# Patient Record
Sex: MALE | Race: WHITE | NOT HISPANIC OR LATINO | Employment: OTHER | URBAN - METROPOLITAN AREA
[De-identification: names, ages, dates, MRNs, and addresses within clinical notes are randomized per-mention and may not be internally consistent; named-entity substitution may affect disease eponyms.]

---

## 2024-07-09 RX ORDER — ATORVASTATIN CALCIUM 20 MG/1
TABLET, FILM COATED ORAL
Refills: 0 | OUTPATIENT
Start: 2024-07-09

## 2024-07-09 NOTE — TELEPHONE ENCOUNTER
Patient has never been seen at [place] before. No history of med list. Patient has upcoming appointment.    Appointments in Next Year      Sep 11, 2024 9:30 AM  (Arrive by 9:10 AM)  New Annual Wellness Visit with Sommer Traore MD  [facility] [place] ([facility] ) [phone]

## 2024-09-11 ENCOUNTER — OFFICE VISIT (OUTPATIENT)
Dept: FAMILY MEDICINE | Facility: CLINIC | Age: 79
End: 2024-09-11
Payer: COMMERCIAL

## 2024-09-11 ENCOUNTER — TELEPHONE (OUTPATIENT)
Dept: FAMILY MEDICINE | Facility: CLINIC | Age: 79
End: 2024-09-11

## 2024-09-11 VITALS
TEMPERATURE: 97.7 F | RESPIRATION RATE: 21 BRPM | HEIGHT: 70 IN | SYSTOLIC BLOOD PRESSURE: 118 MMHG | HEART RATE: 87 BPM | OXYGEN SATURATION: 98 % | DIASTOLIC BLOOD PRESSURE: 72 MMHG | WEIGHT: 187 LBS | BODY MASS INDEX: 26.77 KG/M2

## 2024-09-11 DIAGNOSIS — Z86.73 HISTORY OF CVA (CEREBROVASCULAR ACCIDENT): ICD-10-CM

## 2024-09-11 DIAGNOSIS — E78.5 HYPERLIPIDEMIA LDL GOAL <130: ICD-10-CM

## 2024-09-11 DIAGNOSIS — N52.8 OTHER MALE ERECTILE DYSFUNCTION: ICD-10-CM

## 2024-09-11 DIAGNOSIS — Z00.00 ROUTINE HISTORY AND PHYSICAL EXAMINATION OF ADULT: Primary | ICD-10-CM

## 2024-09-11 DIAGNOSIS — Z86.718 PERSONAL HISTORY OF DVT (DEEP VEIN THROMBOSIS): ICD-10-CM

## 2024-09-11 DIAGNOSIS — J30.2 SEASONAL ALLERGIC RHINITIS, UNSPECIFIED TRIGGER: ICD-10-CM

## 2024-09-11 PROCEDURE — 99204 OFFICE O/P NEW MOD 45 MIN: CPT | Mod: 25 | Performed by: FAMILY MEDICINE

## 2024-09-11 PROCEDURE — G0438 PPPS, INITIAL VISIT: HCPCS | Performed by: FAMILY MEDICINE

## 2024-09-11 RX ORDER — SILDENAFIL CITRATE 20 MG/1
20 TABLET ORAL DAILY PRN
Qty: 30 TABLET | Refills: 2 | Status: SHIPPED | OUTPATIENT
Start: 2024-09-11

## 2024-09-11 RX ORDER — OMEGA-3-ACID ETHYL ESTERS 900 MG/1
CAPSULE, LIQUID FILLED ORAL
COMMUNITY

## 2024-09-11 RX ORDER — IBUPROFEN 200 MG
200 TABLET ORAL EVERY 4 HOURS PRN
COMMUNITY

## 2024-09-11 RX ORDER — CETIRIZINE HYDROCHLORIDE 10 MG/1
10 TABLET ORAL DAILY
COMMUNITY

## 2024-09-11 RX ORDER — FLUTICASONE PROPIONATE 50 MCG
1 SPRAY, SUSPENSION (ML) NASAL
COMMUNITY
End: 2024-09-11

## 2024-09-11 RX ORDER — FLUTICASONE PROPIONATE 50 MCG
1 SPRAY, SUSPENSION (ML) NASAL DAILY PRN
Qty: 1 G | Refills: 11 | Status: SHIPPED | OUTPATIENT
Start: 2024-09-11

## 2024-09-11 RX ORDER — ATORVASTATIN CALCIUM 20 MG/1
20 TABLET, FILM COATED ORAL DAILY
Qty: 90 TABLET | Refills: 3 | Status: SHIPPED | OUTPATIENT
Start: 2024-09-11

## 2024-09-11 RX ORDER — SILDENAFIL CITRATE 20 MG/1
20 TABLET ORAL DAILY
COMMUNITY
Start: 2023-06-06 | End: 2024-09-11

## 2024-09-11 RX ORDER — ATORVASTATIN CALCIUM 20 MG/1
20 TABLET, FILM COATED ORAL DAILY
COMMUNITY
Start: 2023-07-12 | End: 2024-09-11

## 2024-09-11 RX ORDER — ASPIRIN 81 MG/1
81 TABLET ORAL DAILY
COMMUNITY

## 2024-09-11 RX ORDER — MULTIVITAMIN
1 TABLET ORAL
COMMUNITY

## 2024-09-11 ASSESSMENT — ACTIVITIES OF DAILY LIVING (ADL): CURRENT_FUNCTION: NO ASSISTANCE NEEDED

## 2024-09-11 ASSESSMENT — PAIN SCALES - GENERAL: PAINLEVEL: NO PAIN (0)

## 2024-09-11 NOTE — PROGRESS NOTES
"Preventive Care Visit  [facility]AEGANMAYRA DICK  Sommer Traore MD, Family Medicine  Sep 11, 2024      Assessment & Plan     (Z00.00) Routine history and physical examination of adult  (primary encounter diagnosis)  Comment:   Plan: has moved to [place] recently and here to establish care             (E78.5) Hyperlipidemia LDL goal <130  Comment:   Plan: Lipid panel reflex to direct LDL Non-fasting,         atorvastatin (LIPITOR) 20 MG tablet,         fluticasone (FLONASE) 50 MCG/ACT nasal spray,         Comprehensive metabolic panel        Check labs , adjusts dose if needed     (N52.8) Other male erectile dysfunction  Comment: has been stable previously and had no problem taking it.  Plan: sildenafil (REVATIO) 20 MG table            (J30.2) Seasonal allergic rhinitis, unspecified trigger  Comment: winter and spring can be worse - get ETD , well controlled with Flonase and wants to continue   Plan: ftuticasone (FLONASE) 50 MCG/ACT nasal spray            (Z86.73) History of CVA (cerebrovascular accident)  Comment: 10/2023 - has seen neurology/ neurophthalmology- had episode of  diplopia  Plan: fluticasone (FLONASE) 50 MCG/ACT nasal spray            (Z86.718) Personal history of DVT (deep vein thrombosis)  Comment: AFTER KNEE SURGERY / PROVOKED DVT / PE - 2016, ON ASA NOW  Plan: fluticasone (FLONASE) 50 MCG/ACT nasal spray              Check labs. refill sent.Cares and  treatment discussed.  follow. up if problem   Patient expressed understanding and agreement with treatment plan. All patient's questions were answered, will let me know if has more later.  Medications: Rx's: Reviewed the potential side effects/complications of medications prescribed.         BMI  Estimated body mass index is 26.98 kg/m  as calculated from the following:    Height as of this encounter: 1.773 m (5' 9.8\").    Weight as of this encounter: 84.8 kg (187 lb).       Counseling  Appropriate preventive services were addressed with " "this patient via screening, questionnaire, or discussion as appropriate for fall prevention, nutrition, physical activity, Tobacco-use cessation, social engagement, weight loss and cognition.  Checklist reviewing preventive services available has been given to the patient.  Reviewed patient's diet, addressing concerns and/or questions.   The patient was provided with written information regarding signs of hearing loss.       Regular exercise  See Patient Instructions    Subjective   Jairon is a 78 year old, presenting for the following:  Annual Visit        9/11/2024     9:12 AM   Additional Questions   Roomed by Elizabeth KRISHNAN         Health Care Directive  Patient does not have a Health Care Directive or Living Will: Discussed advance care planning with patient; however, patient declined at this time.    Healthy Habits:     In general, how would you rate your overall health?  Good    Frequency of exercise:  4-5 days/week    Duration of exercise:  30-45 minutes    Do you usually eat at least 4 servings of fruit and vegetables a day, include whole grains    & fiber and avoid regularly eating high fat or \"junk\" foods?  Yes    Taking medications regularly:  Yes    Barriers to taking medications:  None    Ability to successfully perform activities of daily living:  No assistance needed    Home Safety:  No safety concerns identified    Hearing Impairment:  Difficulty following a conversation in a noisy restaurant or crowded room    In the past 6 months, have you been bothered by leaking of urine?  No    In general, how would you rate your overall mental or emotional health?  Excellent    Additional concerns today:  No        Hyperlipidemia Follow-Up    Are you regularly taking any medication or supplement to lower your cholesterol?   Yes- see epic   Are you having muscle aches or other side effects that you think could be caused by your cholesterol lowering medication?  No    Has hx of minor CVA event that presented as episode of " diplopia . Had seen neurology/ neuro-opthalmology and he was diagnosed with minor CVA. He has been on statin and asa now since.. has been doing well and no recurrence of sx since then.   He is not fasting for labs today though   He also has hx of provoked DVT/ complicated by PE, after  his knee surgery 10 yrs ago. So was on blood thinner for a short time then      9/6/2024   General Health   How would you rate your overall physical health? Good   Feel stress (tense, anxious, or unable to sleep) Not at all            9/6/2024   Nutrition   Diet: Regular (no restrictions)            9/6/2024   Exercise   Days per week of moderate/strenous exercise 5 days   Average minutes spent exercising at this level 40 min            9/6/2024   Social Factors   Frequency of gathering with friends or relatives More than three times a week   Worry food won't last until get money to buy more No   Food not last or not have enough money for food? No   Do you have housing? (Housing is defined as stable permanent housing and does not include staying ouside in a car, in a tent, in an abandoned building, in an overnight shelter, or couch-surfing.) Yes   Are you worried about losing your housing? No   Lack of transportation? No   Unable to get utilities (heat,electricity)? No            9/6/2024   Fall Risk   Fallen 2 or more times in the past year? No   Trouble with walking or balance? No             9/6/2024   Activities of Daily Living- Home Safety   Needs help with the following daily activites None of the above   Safety concerns in the home None of the above            9/6/2024   Dental   Dentist two times every year? Yes            9/6/2024   Hearing Screening   Hearing concerns? (!) IT'S HARD TO FOLLOW A CONVERSATION IN A NOISY RESTAURANT OR CROWDED ROOM.            9/6/2024   Driving Risk Screening   Patient/family members have concerns about driving No            9/6/2024   General Alertness/Fatigue Screening   Have you been more  tired than usual lately? No            2024   Urinary Incontinence Screening   Bothered by leaking urine in past 6 months No            2024   TB Screening   Were you born outside of the [place]? No            Today's PHQ-2 Score:       9/10/2024     3:47 PM   PHQ-2 (  [employer])   Q1: Little interest or pleasure in doing things 0   Q2: Feeling down, depressed or hopeless 0   PHQ-2 Score 0   Q1: Little interest or pleasure in doing things Not at all   Q2: Feeling down, depressed or hopeless Not at all   PHQ-2 Score 0           2024   Substance Use   Alcohol more than 3/day or more than 7/wk No   Do you have a current opioid prescription? No   How severe/bad is pain from 1 to 10? 0/10 (No Pain)   Do you use any other substances recreationally? No        Social History     Tobacco Use    Smoking status: Never    Smokeless tobacco: Never   Substance Use Topics    Alcohol use: Yes     Comment: Often glass wine with supper    Drug use: Never         Fracture Risk Assessment Tool  Link to Frax Calculator  Use the information below to complete the Frax calculator  : 1945  Sex: male  Weight (kg): 84.8 kg (actual weight)  Height (cm): 177.3 cm  Previous Fragility Fracture:  No  History of parent with fractured hip:  No  Current Smoking:  No  Patient has been on glucocorticoids for more than 3 months (5mg/day or more): No  Rheumatoid Arthritis on Problem List:  No  Secondary Osteoporosis on Problem List:  No  Consumes 3 or more units of alcohol per day: No  Femoral Neck BMD (g/cm2)            Reviewed and updated as needed this visit by Provider                    Patient Active Problem List   Diagnosis    History of CVA (cerebrovascular accident)    Personal history of DVT (deep vein thrombosis)    Hyperlipidemia LDL goal <130    Other male erectile dysfunction    Seasonal allergic rhinitis, unspecified trigger     Past Surgical History:   Procedure Laterality Date    APPENDECTOMY      BIOPSY       "Cancer spot on left shin    COLONOSCOPY  2022    Clear    ENT SURGERY  1946    Tonsillectomy    GENITOURINARY SURGERY  2011    Green Laser to widen prostate flow    ORTHOPEDIC SURGERY  2015    Right knee replaced    SOFT TISSUE SURGERY  2016    Right rotator cuff       Social History     Tobacco Use    Smoking status: Never    Smokeless tobacco: Never   Substance Use Topics    Alcohol use: Yes     Comment: Often glass wine with supper     Family History   Problem Relation Age of Onset    Diabetes Mother     Hyperlipidemia Mother     Hypertension Father     Hyperlipidemia Father     Cerebrovascular Disease Father     Obesity Father     Diabetes Maternal Grandmother     Colon Cancer Paternal Grandmother     Cerebrovascular Disease Paternal Grandfather     Prostate Cancer No family hx of          Current providers sharing in care for this patient include:  Patient Care Team:  Clinic, [place] Nan Turner as PCP - General    The following health maintenance items are reviewed in Epic and correct as of today:  Health Maintenance   Topic Date Due    LIPID  Never done    ANNUAL REVIEW OF HM ORDERS  Never done    GLUCOSE  04/21/2014    RSV VACCINE (1 - 1-dose 75+ series) Never done    INFLUENZA VACCINE (1) 09/01/2024    COVID-19 Vaccine (6 - 2023-24 season) 09/01/2024    FALL RISK ASSESSMENT  09/11/2025    ADVANCE CARE PLANNING  09/11/2029    DTAP/TDAP/TD IMMUNIZATION (3 - Td or Tdap) 03/19/2032    PHQ-2 (once per calendar year)  Completed    Pneumococcal Vaccine: 65+ Years  Completed    ZOSTER IMMUNIZATION  Completed    HPV IMMUNIZATION  Aged Out    MENINGITIS IMMUNIZATION  Aged Out    RSV MONOCLONAL ANTIBODY  Aged Out         Review of Systems  Constitutional, HEENT, cardiovascular, pulmonary, GI, , musculoskeletal, neuro, skin, endocrine and psych systems are negative, except as otherwise noted.     Objective    Exam  /72   Pulse 87   Temp 97.7  F (36.5  C)   Resp 21   Ht 1.773 m (5' 9.8\")   Wt 84.8 kg (187 " "lb)   SpO2 98%   BMI 26.98 kg/m     Estimated body mass index is 26.98 kg/m  as calculated from the following:    Height as of this encounter: 1.773 m (5' 9.8\").    Weight as of this encounter: 84.8 kg (187 lb).    Physical Exam  GENERAL: alert and no distress  EYES: Eyes grossly normal to inspection, PERRL and conjunctivae and sclerae normal  HENT: ear canals and TM's normal, nose and mouth without ulcers or lesions  NECK: no adenopathy, no asymmetry, masses, or scars  RESP: lungs clear to auscultation - no rales, rhonchi or wheezes  CV: regular rate and rhythm, normal S1 S2, no S3 or S4, no murmur, click or rub, no peripheral edema  ABDOMEN: soft, nontender, no hepatosplenomegaly, no masses and bowel sounds normal  MS: no gross musculoskeletal defects noted, no edema  SKIN: no suspicious lesions or rashes  NEURO: Normal strength and tone, mentation intact and speech normal  PSYCH: mentation appears normal, affect normal/bright         9/11/2024   Mini Cog   Clock Draw Score 2 Normal   3 Item Recall 3 objects recalled   Mini Cog Total Score 5                 Signed Electronically by: Sommer Traore MD    "

## 2024-09-11 NOTE — TELEPHONE ENCOUNTER
Retail Pharmacy Prior Authorization Team   Phone: [phone]      PRIOR AUTHORIZATION DENIED    Medication: SILDENAFIL CITRATE 20 MG PO TABS  Insurance Company: [employer] [place] - Phone [phone] Fax [phone]  Denial Date: 9/11/2024  Denial Reason(s):         Appeal Information: Drug exclusions cannot be appealed.  This medication will not be covered by the prescription plan for any reason. The drug is not on formulary and there are no loopholes to gaining approval.    Patient Notified: No. The [employer] [facility] PA Team does not notify of the denial outcomes as the patient often will ask what their provider will prescribe in place of the denied medication, or additional information in regards to other therapies they can take in place of the denied medication.  This is not something we can advise on in our department.

## 2024-09-19 ENCOUNTER — LAB (OUTPATIENT)
Dept: LAB | Facility: CLINIC | Age: 79
End: 2024-09-19
Payer: COMMERCIAL

## 2024-09-19 DIAGNOSIS — Z86.73 HISTORY OF CVA (CEREBROVASCULAR ACCIDENT): ICD-10-CM

## 2024-09-19 DIAGNOSIS — E78.5 HYPERLIPIDEMIA LDL GOAL <130: ICD-10-CM

## 2024-09-19 LAB
ALBUMIN SERPL BCG-MCNC: 4.2 G/DL (ref 3.5–5.2)
ALP SERPL-CCNC: 86 U/L (ref 40–150)
ALT SERPL W P-5'-P-CCNC: 20 U/L (ref 0–70)
ANION GAP SERPL CALCULATED.3IONS-SCNC: 10 MMOL/L (ref 7–15)
AST SERPL W P-5'-P-CCNC: 30 U/L (ref 0–45)
BILIRUB SERPL-MCNC: 0.5 MG/DL
BUN SERPL-MCNC: 24.8 MG/DL (ref 8–23)
CALCIUM SERPL-MCNC: 9.2 MG/DL (ref 8.8–10.4)
CHLORIDE SERPL-SCNC: 107 MMOL/L (ref 98–107)
CHOLEST SERPL-MCNC: 138 MG/DL
CREAT SERPL-MCNC: 0.92 MG/DL (ref 0.67–1.17)
EGFRCR SERPLBLD CKD-EPI 2021: 84 ML/MIN/1.73M2
FASTING STATUS PATIENT QL REPORTED: YES
FASTING STATUS PATIENT QL REPORTED: YES
GLUCOSE SERPL-MCNC: 111 MG/DL (ref 70–99)
HCO3 SERPL-SCNC: 25 MMOL/L (ref 22–29)
HDLC SERPL-MCNC: 63 MG/DL
LDLC SERPL CALC-MCNC: 65 MG/DL
NONHDLC SERPL-MCNC: 75 MG/DL
POTASSIUM SERPL-SCNC: 4.7 MMOL/L (ref 3.4–5.3)
PROT SERPL-MCNC: 7.4 G/DL (ref 6.4–8.3)
SODIUM SERPL-SCNC: 142 MMOL/L (ref 135–145)
TRIGL SERPL-MCNC: 52 MG/DL

## 2024-09-19 PROCEDURE — 36415 COLL VENOUS BLD VENIPUNCTURE: CPT

## 2024-09-19 PROCEDURE — 80053 COMPREHEN METABOLIC PANEL: CPT

## 2024-09-19 PROCEDURE — 80061 LIPID PANEL: CPT

## 2024-09-19 NOTE — TELEPHONE ENCOUNTER
Pt requesting that sildenafil was not able to be picked up. Triage called pharmacy and pharmacist heide stated that they have this Rx ready for the pt.      Spoke with pt and informed him that Rx is ready for . Pt thought Rx was at [facility], pt will go to [facility] to  rx.    Soni Mathis RN

## 2024-09-23 ENCOUNTER — MYC MEDICAL ADVICE (OUTPATIENT)
Dept: FAMILY MEDICINE | Facility: CLINIC | Age: 79
End: 2024-09-23
Payer: COMMERCIAL

## 2024-09-23 ENCOUNTER — NURSE TRIAGE (OUTPATIENT)
Dept: FAMILY MEDICINE | Facility: CLINIC | Age: 79
End: 2024-09-23
Payer: COMMERCIAL

## 2024-09-23 NOTE — TELEPHONE ENCOUNTER
Please see nurse triage encounter 9/23/24.    Leslie LOVE RN  [facility] Triage Team

## 2024-09-23 NOTE — TELEPHONE ENCOUNTER
Called the patient regarding his MyChart message. Patient stated that he is not interested in Paxlovid. Informed patient that if he has any questions or concerns to call the clinic back.    Reason for Disposition   HIGH RISK patient (e.g., weak immune system, age > 64 years, obesity with BMI of 30 or higher, pregnant, chronic lung disease or other chronic medical condition) and COVID symptoms (e.g., cough, fever)  (Exceptions: Already seen by doctor or NP/PA and no new or worsening symptoms.)    Additional Information   Negative: SEVERE difficulty breathing (e.g., struggling for each breath, speaks in single words)   Negative: Difficult to awaken or acting confused (e.g., disoriented, slurred speech)   Negative: Bluish (or gray) lips or face now   Negative: Shock suspected (e.g., cold/pale/clammy skin, too weak to stand, low BP, rapid pulse)   Negative: Sounds like a life-threatening emergency to the triager   Negative: Diagnosed or suspected COVID-19 and symptoms lasting 3 or more weeks   Negative: COVID-19 exposure and no symptoms   Negative: COVID-19 vaccine reaction suspected (e.g., fever, headache, muscle aches) occurring 1 to 3 days after getting vaccine   Negative: COVID-19 vaccine, questions about   Negative: Lives with someone known to have influenza (flu test positive) and flu-like symptoms (e.g., cough, runny nose, sore throat, SOB; with or without fever)   Negative: Possible COVID-19 symptoms and triager concerned about severity of symptoms or other causes   Negative: COVID-19 and breastfeeding, questions about   Negative: SEVERE or constant chest pain or pressure  (Exception: Mild central chest pain, present only when coughing.)   Negative: MODERATE difficulty breathing (e.g., speaks in phrases, SOB even at rest, pulse 100-120)   Negative: Headache and stiff neck (can't touch chin to chest)   Negative: Oxygen level (e.g., pulse oximetry) 90% or lower   Negative: Chest pain or pressure  (Exception: MILD  "central chest pain, present only when coughing.)   Negative: Drinking very little and dehydration suspected (e.g., no urine > 12 hours, very dry mouth, very lightheaded)   Negative: Patient sounds very sick or weak to the triager   Negative: Fever > 101 F (38.3 C) and over 60 years of age   Negative: Fever > 100.0 F (37.8 C) and bedridden (e.g., CVA, chronic illness, recovering from surgery)   Negative: Fever > 103 F (39.4 C)   Negative: MILD difficulty breathing (e.g., minimal/no SOB at rest, SOB with walking, pulse <100)    Answer Assessment - Initial Assessment Questions  1. COVID-19 DIAGNOSIS: \"How do you know that you have COVID?\" (e.g., positive lab test or self-test, diagnosed by doctor or NP/PA, symptoms after exposure).        At home test 9/22/24    2. COVID-19 EXPOSURE: \"Was there any known exposure to COVID before the symptoms began?\" CDC Definition of close contact: within 6 feet (2 meters) for a total of 15 minutes or more over a 24-hour period.         Unsure    3. ONSET: \"When did the COVID-19 symptoms start?\"         9/21/24    4. WORST SYMPTOM: \"What is your worst symptom?\" (e.g., cough, fever, shortness of breath, muscle aches)        Cough    5. COUGH: \"Do you have a cough?\" If Yes, ask: \"How bad is the cough?\"          Mild productive cough    6. FEVER: \"Do you have a fever?\" If Yes, ask: \"What is your temperature, how was it measured, and when did it start?\"        None    7. RESPIRATORY STATUS: \"Describe your breathing?\" (e.g., normal; shortness of breath, wheezing, unable to speak)         Normal    8. BETTER-SAME-WORSE: \"Are you getting better, staying the same or getting worse compared to yesterday?\"  If getting worse, ask, \"In what way?\"        Getting better    9. OTHER SYMPTOMS: \"Do you have any other symptoms?\"  (e.g., chills, fatigue, headache, loss of smell or taste, muscle pain, sore throat)        Nasal congestion    10. HIGH RISK DISEASE: \"Do you have any chronic medical problems?\" " "(e.g., asthma, heart or lung disease, weak immune system, obesity, etc.)          See dx list    11. VACCINE: \"Have you had the COVID-19 vaccine?\" If Yes, ask: \"Which one, how many shots, when did you get it?\"          Yes    12. PREGNANCY: \"Is there any chance you are pregnant?\" \"When was your last menstrual period?\"          N/A    13. O2 SATURATION MONITOR:  \"Do you use an oxygen saturation monitor (pulse oximeter) at home?\" If Yes, ask \"What is your reading (oxygen level) today?\" \"What is your usual oxygen saturation reading?\" (e.g., 95%)          Does not have one    Protocols used: Coronavirus (COVID-19) Diagnosed or Scfdfiass-E-POLAURA LOVE RN  [facility] Triage Team    "

## 2025-06-09 ENCOUNTER — TELEPHONE (OUTPATIENT)
Dept: FAMILY MEDICINE | Facility: CLINIC | Age: 80
End: 2025-06-09
Payer: COMMERCIAL

## 2025-06-09 NOTE — TELEPHONE ENCOUNTER
Requesting genetic testing;     Pt is requesting a test for genetic marker for : 1.) gyp 2d6 2.)cyp 2c 19    Pt's son reporting that he has a possible genetic condition/metabolic issue and would like pt to be tested.     Pt has an upcoming appointment with Dr. Infante and requesting to send order request to future provider.     Order request pended, routing to provider for review. Please sign if approved.

## 2025-06-10 NOTE — TELEPHONE ENCOUNTER
Never seen pt.     These tests will have insurance noncoverage issues and will need a visit before placing orders.  OK To submit E-visit through [employer] for doing the needful.     Please assist pt to submit E-visit through [employer].

## 2025-06-12 NOTE — TELEPHONE ENCOUNTER
Patient has read [employer] message but has not done an E-Visit. Postponing encounter to follow up.     Leslie LOVE RN  [facility] Triage Team

## 2025-06-13 ENCOUNTER — MYC MEDICAL ADVICE (OUTPATIENT)
Dept: FAMILY MEDICINE | Facility: CLINIC | Age: 80
End: 2025-06-13

## 2025-06-13 PROBLEM — Z86.73 HISTORY OF CVA (CEREBROVASCULAR ACCIDENT): Status: RESOLVED | Noted: 2024-09-11 | Resolved: 2025-06-13

## 2025-06-13 PROBLEM — E78.5 DYSLIPIDEMIA: Status: ACTIVE | Noted: 2023-10-10

## 2025-06-13 PROBLEM — N40.1 BENIGN PROSTATIC HYPERPLASIA WITH URINARY FREQUENCY: Status: ACTIVE | Noted: 2025-06-13

## 2025-06-13 PROBLEM — H02.831 DERMATOCHALASIS OF BOTH UPPER EYELIDS: Status: ACTIVE | Noted: 2023-10-10

## 2025-06-13 PROBLEM — E78.2 MIXED HYPERLIPIDEMIA: Status: ACTIVE | Noted: 2024-09-11

## 2025-06-13 PROBLEM — H25.813 COMBINED FORMS OF AGE-RELATED CATARACT, BILATERAL: Status: ACTIVE | Noted: 2023-10-10

## 2025-06-13 PROBLEM — R35.0 BENIGN PROSTATIC HYPERPLASIA WITH URINARY FREQUENCY: Status: ACTIVE | Noted: 2025-06-13

## 2025-06-13 PROBLEM — M65.342 ACQUIRED TRIGGER FINGER OF LEFT RING FINGER: Status: ACTIVE | Noted: 2024-02-04

## 2025-06-13 PROBLEM — M19.049 OSTEOARTHRITIS OF HAND: Status: ACTIVE | Noted: 2024-02-04

## 2025-06-13 PROBLEM — H02.834 DERMATOCHALASIS OF BOTH UPPER EYELIDS: Status: ACTIVE | Noted: 2023-10-10

## 2025-06-13 PROBLEM — H53.2 MONOCULAR DIPLOPIA, RIGHT EYE: Status: ACTIVE | Noted: 2023-08-06

## 2025-06-13 PROBLEM — R73.01 IMPAIRED FASTING GLUCOSE: Status: ACTIVE | Noted: 2025-06-13

## 2025-06-13 PROBLEM — D68.52 PROTHROMBIN GENE MUTATION: Status: ACTIVE | Noted: 2021-05-20

## 2025-06-13 PROBLEM — Z80.0 FAMILY HISTORY OF COLON CANCER REQUIRING SCREENING COLONOSCOPY: Status: ACTIVE | Noted: 2022-05-05

## 2025-06-17 ENCOUNTER — TELEPHONE (OUTPATIENT)
Dept: CONSULT | Facility: CLINIC | Age: 80
End: 2025-06-17
Payer: COMMERCIAL

## 2025-06-17 NOTE — TELEPHONE ENCOUNTER
[facility] Call Center    Phone Message    May a detailed message be left on voicemail: yes     Reason for Call: Other: Appointment      Patients wife is calling back to schedule GC visit. Please give Jairon a call back at [phone]. Encounter also placed in spouses chart to schedule as well.    Action Taken: Message routed to:  Other: Peds Genetics     Travel Screening: Not Applicable

## 2025-06-18 NOTE — TELEPHONE ENCOUNTER
[facility] Call Center    Phone Message    May a detailed message be left on voicemail: yes     Reason for Call: Other: Appointment      Wife is calling back as she has not heard back for scheduling. Writer did notify her that a message was sent and someone should be reaching out in the next 24-48 hours.     Action Taken: Message routed to:  Other: Peds Genetics     Travel Screening: Not Applicable

## 2025-06-23 NOTE — TELEPHONE ENCOUNTER
Appointment scheduled.     Future Appointments   Date Time Provider Department Center   7/24/2025 10:45 AM Ilda Vera [facility] P [facility]

## 2025-07-24 ENCOUNTER — VIRTUAL VISIT (OUTPATIENT)
Dept: CONSULT | Facility: CLINIC | Age: 80
End: 2025-07-24
Attending: INTERNAL MEDICINE
Payer: COMMERCIAL

## 2025-07-24 DIAGNOSIS — Z13.71 ENCOUNTER FOR NONPROCREATIVE GENETIC COUNSELING AND TESTING: Primary | ICD-10-CM

## 2025-07-24 DIAGNOSIS — D68.52 PROTHROMBIN GENE MUTATION: ICD-10-CM

## 2025-07-24 DIAGNOSIS — Z71.83 ENCOUNTER FOR NONPROCREATIVE GENETIC COUNSELING AND TESTING: Primary | ICD-10-CM

## 2025-07-24 DIAGNOSIS — Z78.9 LACK OF DRUG ACTION (PROPERLY PRESCRIBED AND ADMINISTERED): ICD-10-CM

## 2025-07-24 DIAGNOSIS — Z86.711 HISTORY OF PULMONARY EMBOLISM: ICD-10-CM

## 2025-07-24 DIAGNOSIS — T50.905D ADVERSE EFFECT OF DRUG, SUBSEQUENT ENCOUNTER: ICD-10-CM

## 2025-07-24 DIAGNOSIS — Z86.718 PERSONAL HISTORY OF DVT (DEEP VEIN THROMBOSIS): ICD-10-CM

## 2025-07-24 DIAGNOSIS — J30.2 SEASONAL ALLERGIC RHINITIS, UNSPECIFIED TRIGGER: ICD-10-CM

## 2025-07-24 PROCEDURE — 999N000069 HC STATISTIC GENETIC COUNSELING, < 16 MIN: Mod: GT,95

## 2025-07-24 NOTE — PROGRESS NOTES
Name:  Thom Gallegos   :   1945  MRN:   [id number]  Date of service: 2025  Referring Provider: Stacey Infante    Genetic Counseling Consultation Note    Presenting Information   Jairon Gallegos is a 79 year old male referred to the [facility] at the request of Stacey Infante today. Jairon was seen for a genetic counseling appointment to discuss the details of pharmacogenomic testing, including his personal medical history, personal medication history including adverse medication responses, his family history of relevant medication responses, and testing logistics. History is obtained from [facility]ia [facility] and review of the medical record.     ----------------------------------------------------    Plan  At today's visit, we discussed the following plan:     Jairon will be mailed a buccal kit for sample collection for the [facility] Pharmacogenomics Profile.   Jairon will be scheduled with one of our [facility] pharmacists 1-2 weeks after his sample is collected to review the results of the Pharmacogenomics Profile. This appointment can be scheduled by calling [phone] after the sample is collected.     ----------------------------------------------------    Personal History  For additional details, review note from referring provider.  To summarize, Thom has a history of the following:    Patient Active Problem List   Diagnosis    Personal history of DVT (deep vein thrombosis)    Mixed hyperlipidemia    Other male erectile dysfunction    Seasonal allergic rhinitis, unspecified trigger    Acquired trigger finger of left ring finger    Benign prostatic hyperplasia with urinary frequency    Combined forms of age-related cataract, bilateral    Dermatochalasis of both upper eyelids    DJD (degenerative joint disease)    Monocular diplopia, right eye    Dyslipidemia    Family history of colon cancer requiring screening colonoscopy    History of pulmonary embolism    Impaired fasting glucose     Osteoarthritis of hand    Prothrombin gene mutation     Past Medical History:   Diagnosis Date    Arthritis 1994    Cerebral infarction (H) 8/1/2024    Double vision    Personal history of DVT (deep vein thrombosis)     AFTER KNEE SURGERY - PROVOKED 2016, ALSO HAD pe - hspitalized       He is currently taking:   Current Outpatient Medications   Medication Sig Dispense Refill    aspirin 81 MG EC tablet Take 81 mg by mouth daily.      atorvastatin (LIPITOR) 20 MG tablet Take 1 tablet (20 mg) by mouth daily. Take 20 mg by mouth daily. 90 tablet 3    cetirizine (ZYRTEC) 10 MG tablet Take 10 mg by mouth daily.      doxycycline hyclate (VIBRA-TABS) 100 MG tablet Take 1 tablet (100 mg) by mouth 2 times daily for 7 days. 14 tablet 0    fluticasone (FLONASE) 50 MCG/ACT nasal spray Spray 1 spray into both nostrils daily as needed for rhinitis or allergies. 1 g 11    Glucosamine HCl (GLUCOSAMINE PO) Take 1,500 mg by mouth daily.      ibuprofen (ADVIL/MOTRIN) 200 MG tablet Take 200 mg by mouth every 4 hours as needed.      Multiple Vitamin (MULTI-VITAMIN DAILY) TABS Take 1 tablet by mouth.      sildenafil (REVATIO) 20 MG tablet Take 1 tablet (20 mg) by mouth daily as needed. 30 tablet 2     No current facility-administered medications for this visit.       He reports a penicillin allergy but no other major drug concerns. He reports that he and his son both switched from Claritin to Zyrtec after his son pursued pharmacogenomics testing and that both have better results with the new med.     He is pursuing pharmacogenetic testing on the recommendation of his son Ward, a psychiatrist with the [facility] [place]. 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     He does not have any history of organ transplant.   Jairon is a participant in the [employer] study and notes that he has not had any major genetic risks disclosed to him. He reports  previous positive genetic testing for a clotting disorder after his brother Chandu developed blood clots, but this testing was not available for my review.     Family History  Jairon's son, Ward, has known alterations in CYP2D6 and [id number] (his results not personally reviewed at today's visit, but screenshot present in patient's records). Jairon's mother had a history of diabetes and hyperlipidemia. His father had a history of cerebrovascular disease, hyperlipidemia, and hypertension. His maternal grandmother had diabetes, while his paternal grandmother had colon cancer. His paternal grandfather had cerebrovascular disease. No major medication reactions reported within the family.      Jairon's brother, Chandu, was diagnosed with blood clots and had genetic testing that identified a clotting disorder. Jairon is reported to have had testing for his brother's variant, but this was not available for my review.     Genetic Counseling Discussion  For review, our bodies are made of cells that contain our chromosomes which are made up of long stretches of DNA containing our genes. Our genes serve as the instructions for our bodies to grow and function. There are several genes in our body that provide instructions for breaking down the medications we take. We have two copies of each gene, one inherited from our mother and one inherited from our father. When one or both copies of those genes are altered, the way that gene's product functions may change. You may have heard this alterations called mutations, variants, or single-nucelotide polymorphisms (SNPs). Gene products like enzymes, transporters, and receptors are extremely important in determining how our body responds to medications and other outside influences. Changes in the instructions for these gene products may alter the way a medication works within your body.     For example, a change in a gene can slow down the process of medication breakdown. That can lead to too much of  that medication/drug building up in the body which can cause side effects. On the other hand, a change in a gene can speed up the breakdown of a medication so a therapeutic level is not reached. When this happens, the medication may not work well at the standard doses. Identifying changes in these genes via pharmacogenomic testing can help guide which medications might work best for an individual, what dose of a medication may be best, or if a certain medication has a high risk for causing serious side effects.    The pharmacogenomic testing offered at [facility] analyzes several different polymorphisms in different genes associated with drug metabolism. These variants have been determined to be medically actionable by practice guidelines including CPIC (Clinical Pharmacogenetics Implementation Consortium), PharmGKB and/or FDA gene-drug interaction guidelines. Therefore, prescribing recommendations can be made by the results of this test, so this testing for Thom Gallegos is DIAGNOSTIC and is NOT investigational.     The results of this test can provide guidance for several different types of drugs such as antiinflammatories, antithrombotics, lipid-lowering medication, acid-lowering medications, antiemetics, immunosuppressants, antivirals, antifungals, antidepressants, ADHD medications, antimetabolites, and/or hormone antagonists. This means that, while this testing was recommended for a specific reason right now (Jairon's overall medical management), it may provide guidance for future medication use.     As previously mentioned, we inherit our genes from our parents. This means that some of the pharmacogenomic variants found on this test may be shared by other relatives. These results could be helpful to share with other relatives, but it is important to remember that there are many factors that can contribute to how an individual responds to medications. Relatives should consider their own pharmacogenomics  testing to better determine their recommendations and they should consult with their health care providers before making any changes.     What can't pharmacogenomic testing tell me?   There are several other factors that can determine how an individual responds to medications. Some of these factors include environmental factors such as lifestyle choices (diet, alcohol and/or tobacco use) or other medications an individual might be taking, and other factors can include a person's age, sex, ethnic background, or disease state. Identifying genetic changes involved in medication processing is important, but cannot tell us everything about a person. Therefore, this can be an excellent tool but is NOT a guaranteed way to find one perfect solution for a patient.     This pharmacogenomic testing is not looking at every known pharmacogenomic polymorphism and therefore the results cannot tell us about every possible gene-drug interaction. It is also not looking at genes outside of the scope of pharmacogenomics, and therefore, the results will not tell us if Jairon is at risk for other genetic conditions. If Jairon has questions about a possible underlying genetic condition, he should talk with his primary care provider about seeking an appointment with a general genetics provider.      Secondary findings  We reviewed the following about possible secondary or incidental findings in this pharmacogenomics test:  This test could identify [id number] Gilbert syndrome. This is a genetic trait that causes the body to have an impaired ability to process bilirubin, causing it to build in the blood. This usually does not cause symptoms, but can cause elevated bilirubin levels on blood tests. Typically, additional genetic counseling is not needed and primary care providers can manage this condition.    This test could identify G6PD Deficiency disorder, a genetic condition that causes the body to break down red blood cells before it should.  If the body is breaking down blood cells faster than it can replace them, the result is hemolytic anemia. This can cause paleness and yellowing of the skin (jaundice), dark urine, fatigue, shortness of breath, and rapid heart rate. Hemolytic anemia episodes can occur after ingesting bertha beans and certain processed foods with bertha bean products in them. Patients with this result may wish to meet with a genetic counselor to discuss other at-risk family members, but typically primary care providers can manage this condition.   This test could identify neuromuscular concerns that go along with RYR1- or [id number] malignant hyperthermia. Individuals with malignant hyperthermia - a dangerous reaction to certain inhaled medications used in general anesthesia - may be at risk for a condition causing muscle weakness or muscle pain. Individuals who have a result concerning for malignant hyperthermia should return to genetic counseling to discuss these risks and identify at-risk family members.     Testing logistics  [facility] will try to obtain insurance coverage for the pharmacogenomic testing; however, this is not always a covered service. A cost waiver form (ARN) is required, but cost to the patient is not expected to exceed $250.     A blood or buccal sample will be collected for DNA analysis. The results of this test take 1-2 weeks. An appointment will be made with the pharmacist to discuss these results in detail and to discuss the medication recommendations based on these results. These test results will be accessible in [employer] and may be viewable prior to the appointment with the pharmacist, but NO CHANGES SHOULD BE MADE TO MEDICATIONS BEFORE TALKING TO THE PHARMACIST OR HEALTHCARE PROVIDER.     The insurance and billing were explained and he agreed to the billing plan. Jairon agreed to proceed with pharmacogenomic testing today. Due to the fact that this was a virtual visit, we reviewed the content of the  consent forms and Jairon provides verbal authorization.      It was a pleasure meeting with Jairon today. He vocalized understanding of the information we discussed today and all his questions and concerns were addressed. He has been provided with my contact information should any questions arise regarding our visit or plan moving forward. 15 minutes spent on the date of the encounter in chart review, patient visit, test coordination/review, documentation, and/or discussion with other providers about the issues documented above.      Ilda Vera MS, [facility]  Genetic Counselor  [facility]   Email: [email or web address]

## 2025-07-24 NOTE — LETTER
2025      RE: Thom Gallegos  [address]  [address]  [place] [address]     Dear Colleague,    Thank you for the opportunity to participate in the care of your patient, Thom Gallegos, at the [facility] at [facility]. Please see a copy of my visit note below.    Name:  Thom Gallegos   :   1945  MRN:   [id number]  Date of service: 2025  Referring Provider: Stacey Infante    Genetic Counseling Consultation Note    Presenting Information   Jairon Gallegos is a 79 year old male referred to the [facility] at the request of Stacey Infante today. Jairon was seen for a genetic counseling appointment to discuss the details of pharmacogenomic testing, including his personal medical history, personal medication history including adverse medication responses, his family history of relevant medication responses, and testing logistics. History is obtained from Maia De La O and review of the medical record.     ----------------------------------------------------    Plan  At today's visit, we discussed the following plan:     Jairon will be mailed a buccal kit for sample collection for the [facility] Pharmacogenomics Profile.   Jairon will be scheduled with one of our [facility] pharmacists 1-2 weeks after his sample is collected to review the results of the Pharmacogenomics Profile. This appointment can be scheduled by calling [phone] after the sample is collected.     ----------------------------------------------------    Personal History  For additional details, review note from referring provider.  To summarize, Thom has a history of the following:    Patient Active Problem List   Diagnosis     Personal history of DVT (deep vein thrombosis)     Mixed hyperlipidemia     Other male erectile dysfunction     Seasonal allergic rhinitis, unspecified trigger     Acquired trigger finger of left  ring finger     Benign prostatic hyperplasia with urinary frequency     Combined forms of age-related cataract, bilateral     Dermatochalasis of both upper eyelids     DJD (degenerative joint disease)     Monocular diplopia, right eye     Dyslipidemia     Family history of colon cancer requiring screening colonoscopy     History of pulmonary embolism     Impaired fasting glucose     Osteoarthritis of hand     Prothrombin gene mutation     Past Medical History:   Diagnosis Date     Arthritis 1994     Cerebral infarction (H) 8/1/2024    Double vision     Personal history of DVT (deep vein thrombosis)     AFTER KNEE SURGERY - PROVOKED 2016, ALSO HAD pe - hspitalized       He is currently taking:   Current Outpatient Medications   Medication Sig Dispense Refill     aspirin 81 MG EC tablet Take 81 mg by mouth daily.       atorvastatin (LIPITOR) 20 MG tablet Take 1 tablet (20 mg) by mouth daily. Take 20 mg by mouth daily. 90 tablet 3     cetirizine (ZYRTEC) 10 MG tablet Take 10 mg by mouth daily.       doxycycline hyclate (VIBRA-TABS) 100 MG tablet Take 1 tablet (100 mg) by mouth 2 times daily for 7 days. 14 tablet 0     fluticasone (FLONASE) 50 MCG/ACT nasal spray Spray 1 spray into both nostrils daily as needed for rhinitis or allergies. 1 g 11     Glucosamine HCl (GLUCOSAMINE PO) Take 1,500 mg by mouth daily.       ibuprofen (ADVIL/MOTRIN) 200 MG tablet Take 200 mg by mouth every 4 hours as needed.       Multiple Vitamin (MULTI-VITAMIN DAILY) TABS Take 1 tablet by mouth.       sildenafil (REVATIO) 20 MG tablet Take 1 tablet (20 mg) by mouth daily as needed. 30 tablet 2     No current facility-administered medications for this visit.       He reports a penicillin allergy but no other major drug concerns. He reports that he and his son both switched from Claritin to Zyrtec after his son pursued pharmacogenomics testing and that both have better results with the new med.     He is pursuing pharmacogenetic testing on the  recommendation of his son Ward, a psychiatrist with the [facility]. 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     He does not have any history of organ transplant.   Jairon is a participant in the [employer] study and notes that he has not had any major genetic risks disclosed to him. He reports previous positive genetic testing for a clotting disorder after his brother Chandu developed blood clots, but this testing was not available for my review.     Family History  Jairon's son, Ward, has known alterations in CYP2D6 and COK2O92 (his results not personally reviewed at today's visit, but screenshot present in patient's records). Jairon's mother had a history of diabetes and hyperlipidemia. His father had a history of cerebrovascular disease, hyperlipidemia, and hypertension. His maternal grandmother had diabetes, while his paternal grandmother had colon cancer. His paternal grandfather had cerebrovascular disease. No major medication reactions reported within the family.      Jairon's brother, Chandu, was diagnosed with blood clots and had genetic testing that identified a clotting disorder. Jairon is reported to have had testing for his brother's variant, but this was not available for my review.     Genetic Counseling Discussion  For review, our bodies are made of cells that contain our chromosomes which are made up of long stretches of DNA containing our genes. Our genes serve as the instructions for our bodies to grow and function. There are several genes in our body that provide instructions for breaking down the medications we take. We have two copies of each gene, one inherited from our mother and one inherited from our father. When one or both copies of those genes are altered, the way that gene's product functions may change. You may have heard this alterations called mutations, variants, or single-nucelotide polymorphisms  (SNPs). Gene products like enzymes, transporters, and receptors are extremely important in determining how our body responds to medications and other outside influences. Changes in the instructions for these gene products may alter the way a medication works within your body.     For example, a change in a gene can slow down the process of medication breakdown. That can lead to too much of that medication/drug building up in the body which can cause side effects. On the other hand, a change in a gene can speed up the breakdown of a medication so a therapeutic level is not reached. When this happens, the medication may not work well at the standard doses. Identifying changes in these genes via pharmacogenomic testing can help guide which medications might work best for an individual, what dose of a medication may be best, or if a certain medication has a high risk for causing serious side effects.    The pharmacogenomic testing offered at [facility] analyzes several different polymorphisms in different genes associated with drug metabolism. These variants have been determined to be medically actionable by practice guidelines including CPIC (Clinical Pharmacogenetics Implementation Consortium), PharmGKB and/or FDA gene-drug interaction guidelines. Therefore, prescribing recommendations can be made by the results of this test, so this testing for Thom Gallegos is DIAGNOSTIC and is NOT investigational.     The results of this test can provide guidance for several different types of drugs such as antiinflammatories, antithrombotics, lipid-lowering medication, acid-lowering medications, antiemetics, immunosuppressants, antivirals, antifungals, antidepressants, ADHD medications, antimetabolites, and/or hormone antagonists. This means that, while this testing was recommended for a specific reason right now (Jairon's overall medical management), it may provide guidance for future medication use.     As previously  mentioned, we inherit our genes from our parents. This means that some of the pharmacogenomic variants found on this test may be shared by other relatives. These results could be helpful to share with other relatives, but it is important to remember that there are many factors that can contribute to how an individual responds to medications. Relatives should consider their own pharmacogenomics testing to better determine their recommendations and they should consult with their health care providers before making any changes.     What can't pharmacogenomic testing tell me?   There are several other factors that can determine how an individual responds to medications. Some of these factors include environmental factors such as lifestyle choices (diet, alcohol and/or tobacco use) or other medications an individual might be taking, and other factors can include a person's age, sex, ethnic background, or disease state. Identifying genetic changes involved in medication processing is important, but cannot tell us everything about a person. Therefore, this can be an excellent tool but is NOT a guaranteed way to find one perfect solution for a patient.     This pharmacogenomic testing is not looking at every known pharmacogenomic polymorphism and therefore the results cannot tell us about every possible gene-drug interaction. It is also not looking at genes outside of the scope of pharmacogenomics, and therefore, the results will not tell us if Jairon is at risk for other genetic conditions. If Jairon has questions about a possible underlying genetic condition, he should talk with his primary care provider about seeking an appointment with a general genetics provider.      Secondary findings  We reviewed the following about possible secondary or incidental findings in this pharmacogenomics test:  This test could identify [id number] Gilbert syndrome. This is a genetic trait that causes the body to have an impaired ability to  process bilirubin, causing it to build in the blood. This usually does not cause symptoms, but can cause elevated bilirubin levels on blood tests. Typically, additional genetic counseling is not needed and primary care providers can manage this condition.    This test could identify G6PD Deficiency disorder, a genetic condition that causes the body to break down red blood cells before it should. If the body is breaking down blood cells faster than it can replace them, the result is hemolytic anemia. This can cause paleness and yellowing of the skin (jaundice), dark urine, fatigue, shortness of breath, and rapid heart rate. Hemolytic anemia episodes can occur after ingesting bertha beans and certain processed foods with bertha bean products in them. Patients with this result may wish to meet with a genetic counselor to discuss other at-risk family members, but typically primary care providers can manage this condition.   This test could identify neuromuscular concerns that go along with RYR1- or [id number] malignant hyperthermia. Individuals with malignant hyperthermia - a dangerous reaction to certain inhaled medications used in general anesthesia - may be at risk for a condition causing muscle weakness or muscle pain. Individuals who have a result concerning for malignant hyperthermia should return to genetic counseling to discuss these risks and identify at-risk family members.     Testing logistics  [facility] will try to obtain insurance coverage for the pharmacogenomic testing; however, this is not always a covered service. A cost waiver form (ARN) is required, but cost to the patient is not expected to exceed $250.     A blood or buccal sample will be collected for DNA analysis. The results of this test take 1-2 weeks. An appointment will be made with the pharmacist to discuss these results in detail and to discuss the medication recommendations based on these results. These test results will be  accessible in [employer] and may be viewable prior to the appointment with the pharmacist, but NO CHANGES SHOULD BE MADE TO MEDICATIONS BEFORE TALKING TO THE PHARMACIST OR HEALTHCARE PROVIDER.     The insurance and billing were explained and he agreed to the billing plan. Jairon agreed to proceed with pharmacogenomic testing today. Due to the fact that this was a virtual visit, we reviewed the content of the consent forms and Jairon provides verbal authorization.      It was a pleasure meeting with Jairon today. He vocalized understanding of the information we discussed today and all his questions and concerns were addressed. He has been provided with my contact information should any questions arise regarding our visit or plan moving forward. 15 minutes spent on the date of the encounter in chart review, patient visit, test coordination/review, documentation, and/or discussion with other providers about the issues documented above.      Ilda Vera MS, [facility]  Genetic Counselor  [facility]   Email: [email or web address]        Please do not hesitate to contact me if you have any questions/concerns.     Sincerely,       Ilda Vera, GC

## 2025-07-31 ENCOUNTER — E-VISIT (OUTPATIENT)
Dept: FAMILY MEDICINE | Facility: CLINIC | Age: 80
End: 2025-07-31
Payer: COMMERCIAL

## 2025-07-31 DIAGNOSIS — L60.0 INGROWN NAIL OF GREAT TOE OF RIGHT FOOT: Primary | ICD-10-CM

## 2025-07-31 RX ORDER — IBUPROFEN 200 MG
CAPSULE ORAL 3 TIMES DAILY
Qty: 28.3 G | Refills: 0 | Status: SHIPPED | OUTPATIENT
Start: 2025-07-31

## 2025-07-31 NOTE — TELEPHONE ENCOUNTER
Provider E-Visit time total (minutes): 11 minutes       Sent in [employer] message as below -     Daniel De La O,     Thank you for sharing the picture thank you for sharing the picture, yes it appears like ingrown toenail which is bothering you on the right side.      I do not suspect an infection but for your reassurance okay to send the triple antibiotic as requested.  Monitor for an antibiotic.  Placed referral to podiatry for possible need of procedural needs, including removal of the ingrown toenail.    Thank you  Stacey Infante MD on 7/31/2025 at 3:46 PM

## 2025-07-31 NOTE — PATIENT INSTRUCTIONS
Thank you for choosing us for your care. I have placed the below referral(s) for you:  Orders Placed This Encounter   Procedures     Orthopedic  Referral     Please click the link for your After Visit Summary to view scheduling instructions for your referral. In most cases, you will be contacted within 2 business days to schedule. If you do not hear from a representative within that time, please call [phone] to be connected to a .

## 2025-08-06 PROCEDURE — 81418 RX METAB GEN SEQ ALYS PNL 6: CPT | Performed by: INTERNAL MEDICINE

## 2025-08-14 DIAGNOSIS — E78.5 HYPERLIPIDEMIA LDL GOAL <130: ICD-10-CM

## 2025-08-14 RX ORDER — ATORVASTATIN CALCIUM 20 MG/1
20 TABLET, FILM COATED ORAL DAILY
Qty: 90 TABLET | Refills: 2 | Status: SHIPPED | OUTPATIENT
Start: 2025-08-14

## 2025-08-18 ENCOUNTER — OFFICE VISIT (OUTPATIENT)
Dept: DERMATOLOGY | Facility: CLINIC | Age: 80
End: 2025-08-18
Payer: COMMERCIAL

## 2025-08-18 DIAGNOSIS — L57.0 ACTINIC KERATOSIS: ICD-10-CM

## 2025-08-18 DIAGNOSIS — L81.4 LENTIGINES: ICD-10-CM

## 2025-08-18 DIAGNOSIS — Z12.83 SKIN CANCER SCREENING: ICD-10-CM

## 2025-08-18 DIAGNOSIS — D18.01 CHERRY ANGIOMA: ICD-10-CM

## 2025-08-18 DIAGNOSIS — D22.9 MULTIPLE BENIGN NEVI: Primary | ICD-10-CM

## 2025-08-18 DIAGNOSIS — L82.1 SEBORRHEIC KERATOSES: ICD-10-CM

## 2025-08-18 DIAGNOSIS — Z85.828 HISTORY OF NONMELANOMA SKIN CANCER: ICD-10-CM

## 2025-08-18 PROCEDURE — 17000 DESTRUCT PREMALG LESION: CPT | Performed by: PHYSICIAN ASSISTANT

## 2025-08-18 PROCEDURE — 17003 DESTRUCT PREMALG LES 2-14: CPT | Performed by: PHYSICIAN ASSISTANT

## 2025-08-18 PROCEDURE — 99203 OFFICE O/P NEW LOW 30 MIN: CPT | Mod: 25 | Performed by: PHYSICIAN ASSISTANT

## 2025-08-19 LAB
GO4PGX COMMENTS: NORMAL
GO4PGX DISCLAIMER: NORMAL
GO4PGX LIMITATIONS: NORMAL
GO4PGX METHODOLOGY: NORMAL
LAB TESTS NARRATIVE: NORMAL
PGX ABOUT THE TEST: NORMAL
PGX VARIANTS: NORMAL